# Patient Record
Sex: FEMALE | Race: BLACK OR AFRICAN AMERICAN | NOT HISPANIC OR LATINO | URBAN - METROPOLITAN AREA
[De-identification: names, ages, dates, MRNs, and addresses within clinical notes are randomized per-mention and may not be internally consistent; named-entity substitution may affect disease eponyms.]

---

## 2021-12-20 ENCOUNTER — OFFICE (OUTPATIENT)
Dept: URBAN - METROPOLITAN AREA CLINIC 11 | Facility: CLINIC | Age: 56
End: 2021-12-20

## 2021-12-20 VITALS
HEART RATE: 60 BPM | HEIGHT: 66 IN | WEIGHT: 151 LBS | SYSTOLIC BLOOD PRESSURE: 111 MMHG | OXYGEN SATURATION: 99 % | DIASTOLIC BLOOD PRESSURE: 65 MMHG

## 2021-12-20 DIAGNOSIS — K59.00 CONSTIPATION, UNSPECIFIED: ICD-10-CM

## 2021-12-20 DIAGNOSIS — R19.4 CHANGE IN BOWEL HABIT: ICD-10-CM

## 2021-12-20 PROCEDURE — 99204 OFFICE O/P NEW MOD 45 MIN: CPT | Performed by: INTERNAL MEDICINE

## 2021-12-20 RX ORDER — SODIUM SULFATE, POTASSIUM SULFATE, MAGNESIUM SULFATE 17.5; 3.13; 1.6 G/ML; G/ML; G/ML
SOLUTION, CONCENTRATE ORAL
Qty: 354 | Refills: 0 | Status: COMPLETED
Start: 2021-12-20 | End: 2024-05-15

## 2021-12-20 NOTE — SERVICEHPINOTES
She presents stating that she had a lymph node removed from her groin last year which she thought was a lymphoma but she was not certain.  (No records were available today for this).  She did have a list of normal chemistries and CBC. Recently she has had some unintentional weight loss  of 2-5lbs over the past month. She was told that she might need a colonoscopy.  She has "long solid" stool about once every 2-3 days.  She has not had diarrhea since August.  She has had some "stomach noises and gas".  She stated that she can drink water and hear it pass through her abdomen.  
br
chely She thought her last colonoscopy was about 8 years ago.  She has not had a family hx of colon cancer or colon polys.  She has not been anemic. She has not had blood in her stools.  
br
brAs to her lymph node issue, she has yearly f/u CTs and did have a PET scan done at the time.  She had her studies reviewed at [facility]. br
chely She stated that she has been having issues with "food allergies" for a few years.  She has thought that she has had issues with chicken and canned foods.  She thought it could be a "particular sauce" at times.  She has not had difficulties in about 8 months.  She stated that the "allergy" is signaled by diarrhea and vomiting.  She stated that it was associated with some gas.

## 2021-12-20 NOTE — SERVICENOTES
We discussed her concerns about her changes in her bowel patterns from daily stools to every 2-3 days as well as her gas difficulties.  We reviewed a higher fiber diet plan, supplements, and antigas diet plan (reviewed her diet).  With the change in bowel pattern, prior colonoscopy 8 or so years ago) and concerns, we can do her screening colonoscopy.

## 2024-05-15 ENCOUNTER — OFFICE (OUTPATIENT)
Dept: URBAN - METROPOLITAN AREA CLINIC 9 | Facility: CLINIC | Age: 59
End: 2024-05-15

## 2024-05-15 VITALS
SYSTOLIC BLOOD PRESSURE: 110 MMHG | DIASTOLIC BLOOD PRESSURE: 72 MMHG | HEIGHT: 66 IN | HEART RATE: 71 BPM | WEIGHT: 156 LBS | OXYGEN SATURATION: 99 %

## 2024-05-15 DIAGNOSIS — R13.10 DYSPHAGIA, UNSPECIFIED: ICD-10-CM

## 2024-05-15 DIAGNOSIS — K21.9 GASTRO-ESOPHAGEAL REFLUX DISEASE WITHOUT ESOPHAGITIS: ICD-10-CM

## 2024-05-15 DIAGNOSIS — R10.13 EPIGASTRIC PAIN: ICD-10-CM

## 2024-05-15 PROCEDURE — 99213 OFFICE O/P EST LOW 20 MIN: CPT
